# Patient Record
Sex: FEMALE | Race: WHITE
[De-identification: names, ages, dates, MRNs, and addresses within clinical notes are randomized per-mention and may not be internally consistent; named-entity substitution may affect disease eponyms.]

---

## 2017-02-24 ENCOUNTER — HOSPITAL ENCOUNTER (OUTPATIENT)
Dept: HOSPITAL 80 - FIMAGING | Age: 62
End: 2017-02-24
Attending: GENERAL ACUTE CARE HOSPITAL
Payer: COMMERCIAL

## 2017-02-24 DIAGNOSIS — Z12.31: Primary | ICD-10-CM

## 2017-02-24 PROCEDURE — G0202 SCR MAMMO BI INCL CAD: HCPCS

## 2018-02-26 ENCOUNTER — HOSPITAL ENCOUNTER (OUTPATIENT)
Dept: HOSPITAL 80 - FIMAGING | Age: 63
End: 2018-02-26
Attending: OBSTETRICS & GYNECOLOGY
Payer: COMMERCIAL

## 2018-02-26 DIAGNOSIS — Z80.3: ICD-10-CM

## 2018-02-26 DIAGNOSIS — Z12.31: Primary | ICD-10-CM

## 2018-03-01 ENCOUNTER — HOSPITAL ENCOUNTER (OUTPATIENT)
Dept: HOSPITAL 80 - FSGY | Age: 63
Discharge: HOME | End: 2018-03-01
Attending: OBSTETRICS & GYNECOLOGY
Payer: COMMERCIAL

## 2018-03-01 VITALS — OXYGEN SATURATION: 91 % | SYSTOLIC BLOOD PRESSURE: 116 MMHG | DIASTOLIC BLOOD PRESSURE: 69 MMHG

## 2018-03-01 VITALS — HEART RATE: 69 BPM | TEMPERATURE: 97.7 F | RESPIRATION RATE: 16 BRPM

## 2018-03-01 DIAGNOSIS — N88.2: ICD-10-CM

## 2018-03-01 DIAGNOSIS — N95.0: ICD-10-CM

## 2018-03-01 DIAGNOSIS — E03.9: ICD-10-CM

## 2018-03-01 DIAGNOSIS — D25.0: Primary | ICD-10-CM

## 2018-03-01 PROCEDURE — 58561 HYSTEROSCOPY REMOVE MYOMA: CPT

## 2018-03-01 PROCEDURE — C1782 MORCELLATOR: HCPCS

## 2018-03-01 PROCEDURE — 0UDB8ZX EXTRACTION OF ENDOMETRIUM, VIA NATURAL OR ARTIFICIAL OPENING ENDOSCOPIC, DIAGNOSTIC: ICD-10-PCS | Performed by: OBSTETRICS & GYNECOLOGY

## 2018-03-01 NOTE — GOP
[f rep st]



                                                                OPERATIVE REPORT





DATE OF OPERATION:  03/01/2018



SURGEON:  Deedee Ignacio MD



ANESTHESIA:  Dr. Rivera, general LMA.



PREOPERATIVE DIAGNOSIS:  Endometrial mass noted on pelvic ultrasound.



POSTOPERATIVE DIAGNOSIS:  Submucosal fibroid.



PROCEDURE PERFORMED:  Hysteroscopic myomectomy.



FINDINGS:  

1.  Very stenotic cervix.

2.  Copious mucousy drainage once the cervix was dilated. 

3.  Fundal fibroid.





ESTIMATED BLOOD LOSS:  Minimal.



INDICATIONS:  Patient is a 62-year-old who was referred by Dr. Ivy Arce for pelvic ultrasound for
 evaluation of endometrium due to taking several vaginal estrogen products.  Ultrasound showed copiou
s fluid in the cervix and lower uterine segment and then a mass in the fundal aspect of the endometri
um.  Recommend surgical removal and pathological evaluation.



DESCRIPTION OF PROCEDURE:  With informed consent signed, patient taken to the operating room and plac
ed under a general anesthesia, placed in the low dorsal lithotomy position, prepped and draped in the
 usual sterile fashion.  Tenaculum placed on the anterior lip of the cervix and posterior lip of the 
cervix and there was significant difficulty with finding the cervical os.  Once this was detected, ve
ry gradual dilation done and this was very difficult because I had a hard time getting into the endom
etrium, and after I was able to dilate the cervix up to 7 mm and during this process, there were copi
ous amounts of bloody mucus draining out of the cervix.  Placed the hysteroscope using normal saline 
as a filling medium and there was significant difficulty finding the endometrium with dilation.  Ther
e was a track into the anterior portion of the uterus, but with some was able to get the hysteroscope
 into the endometrium and findings as noted above.  The [employer] Pascual-clear mini ultra morcell
ator blade placed into the hysteroscope and resection of the fibroid done without complication.  The 
endometrium was clean in appearance.  The hysteroscope removed.  Hemostasis noted.  Patient placed in
 supine position, awakened in operating room and taken to the recovery room in stable condition binh
ating the procedure well.



COMPLICATIONS:  None.





Job #:  [id number]

## 2018-03-01 NOTE — PDANEPAE
ANE History of Present Illness





post menopausal bleed





ANE Past Medical History





- Cardiovascular History


Hx Hypertension: No


Hx Arrhythmias: No


Hx Chest Pain: No


Hx Coronary Artery / Peripheral Vascular Disease: No


Hx CHF / Valvular Disease: No


Hx Palpitations: No





- Pulmonary History


Hx COPD: No


Hx Asthma/Reactive Airway Disease: No


Hx Recent Upper Respiratory Infection: No


Hx Oxygen in Use at Home: No


Hx Sleep Apnea: No


Sleep Apnea Screening Result - Last Documented: Negative





- Neurologic History


Hx Cerebrovascular Accident: No


Hx Seizures: No


Hx Dementia: No





- Endocrine History


Hx Diabetes: No





- Renal History


Hx Renal Disorders: No





- Liver History


Hx Hepatic Disorders: No





- Neurological & Psychiatric Hx


Hx Neurological and Psychiatric Disorders: Yes


Neurological / Psychiatric History Comment: raynards, tips fingers turn white 

and numb





- Cancer History


Hx Cancer: No





- Congenital Disorder History


Hx Congenital Disorders: No





- GI History


Hx Gastrointestinal Disorders: No





- Other Health History


Other Health History: uterine polyps.  remote hx of iron anemia





- Chronic Pain History


Chronic Pain: No





- Surgical History


Prior Surgeries: none





ANE Review of Systems


Review of Systems: 








- Exercise capacity


METS (RN): 5 METS





ANE Patient History





- Allergies


Allergies/Adverse Reactions: 








No Known Allergies Allergy (Verified 02/20/18 11:40)


 








- Home Medications


Home Medications: 








Levothyroxine Sodium [Levothroid]  10/31/11 [Last Taken 02/28/18 06:00 Removed]


Vaginal Ring Hrt  10/31/11 [Last Taken 02/26/18 Removed]


Estrace Vaginal (RX)  10/10/15 [Last Taken 02/28/18 22:30]


Osphena  02/20/18 [Last Taken 02/28/18 19:00]








- NPO status


NPO Since - Liquids (Date): 03/01/18


NPO Since - Liquids (Time): 03:00


NPO Since - Solids (Date): 02/28/18


NPO Since - Solids (Time): 19:30





- Smoking Hx


Smoking Status: Never smoked





- Family Anes Hx


Family Hx Anesthesia Complications: none





ANE Labs/Vital Signs





- Vital Signs


Blood Pressure: 98/67


Heart Rate: 70


Respiratory Rate: 16


O2 Sat (%): 98


Height: 163.83 cm


Weight: 57.606 kg





ANE Physical Exam





- Airway


Neck exam: FROM


Mallampati Score: Class 1


Mouth exam: normal dental/mouth exam





- Pulmonary


Pulmonary: no respiratory distress





- Cardiovascular


Cardiovascular: regular rate and rhythym





- ASA Status


ASA Status: II





ANE Anesthesia Plan


Anesthesia Plan: GA w LMA

## 2018-03-01 NOTE — POSTOPPROG
Post Op Note


Date of Operation: 03/01/18


Surgeon: Deedee Ignacio


Anesthesiologist: 


Anesthesia: LMA


Pre-op Diagnosis: endometerial mass


Post-op Diagnosis: submucosal fibroid


Indication: endometrial mass


Procedure: H/S myomectomy


Findings: submucosal fibroid


Inf/Abcess present in the surg proc area at time of surgery?: No


EBL: Minimal

## 2019-02-28 ENCOUNTER — HOSPITAL ENCOUNTER (OUTPATIENT)
Dept: HOSPITAL 80 - FIMAGING | Age: 64
End: 2019-02-28
Payer: COMMERCIAL